# Patient Record
Sex: FEMALE | Race: WHITE | Employment: OTHER | URBAN - METROPOLITAN AREA
[De-identification: names, ages, dates, MRNs, and addresses within clinical notes are randomized per-mention and may not be internally consistent; named-entity substitution may affect disease eponyms.]

---

## 2024-05-18 ENCOUNTER — APPOINTMENT (OUTPATIENT)
Dept: GENERAL RADIOLOGY | Facility: CLINIC | Age: 74
End: 2024-05-18
Payer: MEDICARE

## 2024-05-18 ENCOUNTER — HOSPITAL ENCOUNTER (EMERGENCY)
Facility: CLINIC | Age: 74
Discharge: HOME | End: 2024-05-18
Attending: EMERGENCY MEDICINE
Payer: MEDICARE

## 2024-05-18 VITALS
HEART RATE: 71 BPM | BODY MASS INDEX: 23.95 KG/M2 | DIASTOLIC BLOOD PRESSURE: 74 MMHG | OXYGEN SATURATION: 98 % | SYSTOLIC BLOOD PRESSURE: 128 MMHG | TEMPERATURE: 98.6 F | RESPIRATION RATE: 17 BRPM | HEIGHT: 66 IN | WEIGHT: 149 LBS

## 2024-05-18 DIAGNOSIS — S83.91XA SPRAIN OF RIGHT KNEE, UNSPECIFIED LIGAMENT, INITIAL ENCOUNTER: ICD-10-CM

## 2024-05-18 DIAGNOSIS — S93.401A SPRAIN OF RIGHT ANKLE, UNSPECIFIED LIGAMENT, INITIAL ENCOUNTER: Primary | ICD-10-CM

## 2024-05-18 PROCEDURE — 6370000000 HC RX 637 (ALT 250 FOR IP): Performed by: PHYSICIAN ASSISTANT

## 2024-05-18 PROCEDURE — 99283 EMERGENCY DEPT VISIT LOW MDM: CPT

## 2024-05-18 PROCEDURE — 73562 X-RAY EXAM OF KNEE 3: CPT

## 2024-05-18 PROCEDURE — 73630 X-RAY EXAM OF FOOT: CPT

## 2024-05-18 PROCEDURE — 73610 X-RAY EXAM OF ANKLE: CPT

## 2024-05-18 RX ORDER — ACETAMINOPHEN 500 MG
1000 TABLET ORAL ONCE
Status: COMPLETED | OUTPATIENT
Start: 2024-05-18 | End: 2024-05-18

## 2024-05-18 RX ORDER — SUMATRIPTAN 100 MG/1
100 TABLET, FILM COATED ORAL
COMMUNITY

## 2024-05-18 RX ADMIN — ACETAMINOPHEN 1000 MG: 500 TABLET ORAL at 13:22

## 2024-05-18 ASSESSMENT — PAIN SCALES - GENERAL: PAINLEVEL_OUTOF10: 5

## 2024-05-18 ASSESSMENT — PAIN - FUNCTIONAL ASSESSMENT: PAIN_FUNCTIONAL_ASSESSMENT: 0-10

## 2024-05-18 NOTE — ED PROVIDER NOTES
[facility]  eMERGENCY dEPARTMENT eNCOUnter      Pt Name: Felipa Ferrari  MRN: [id number]  Birthdate 1950  Date of evaluation: 2024  Provider: Elliot Austin PA-C    CHIEF COMPLAINT       Chief Complaint   Patient presents with    Fall    Knee Injury     right    Ankle Pain     right           HISTORY OF PRESENT ILLNESS  (Location/Symptom, Timing/Onset, Context/Setting, Quality, Duration, Modifying Factors, Severity.)   Felipa Ferrari is a 74 y.o. female who presents to the emergency department via EMS after a fall at home.  Patient was coming down the stairs in her socks, slipped falling on her right knee and hurting her right ankle.  She denies any head injury or loss of consciousness.  Denies any hip pain or any back pain or any neck pain.  States that most of the pain is in her right knee and her right ankle.      Nursing Notes were reviewed.    REVIEW OF SYSTEMS    (2-9 systems for level 4, 10 or more for level 5)     Review of Systems     Except as noted above the remainder of the review of systems was reviewed and negative.       PAST MEDICAL HISTORY     Past Medical History:   Diagnosis Date    Migraine      None otherwise stated in nurses notes    SURGICAL HISTORY       Past Surgical History:   Procedure Laterality Date     SECTION      FOOT SURGERY      GALLBLADDER SURGERY      OVARIAN CYST REMOVAL      WISDOM TOOTH EXTRACTION       None otherwise stated in nurses notes    CURRENT MEDICATIONS       Discharge Medication List as of 2024  2:09 PM        CONTINUE these medications which have NOT CHANGED    Details   rimegepant sulfate 75 MG TBDP Take by mouthHistorical Med      SUMAtriptan (IMITREX) 100 MG tablet Take 1 tablet by mouth once as neededHistorical Med             ALLERGIES     Patient has no known allergies.    FAMILY HISTORY     No family history on file.  No family status information on file.      None otherwise stated in nurses notes    SOCIAL  HISTORY         lives at home with others     PHYSICAL EXAM    (up to 7 for level 4, 8 or more for level 5)     ED Triage Vitals [05/18/24 1205]   BP Temp Temp src Pulse Respirations SpO2 Height Weight - Scale   128/74 -- -- 71 17 98 % 1.676 m (5' 6\") 67.6 kg (149 lb)       Physical Exam   Nursing note and vitals reviewed.  Constitutional: Oriented to person, place, and time and well-developed, well-nourished.   Head: Normocephalic and atraumatic.   Ear: External ears normal.   Nose: Nose normal and midline.   Eyes: Conjunctivae and EOM are normal. Pupils are equal, round, and reactive to light.   Neck: Normal range of motion.   Musculoskeletal: Tenderness palpation lateral malleolus of the right ankle.  Range of motion is intact but slightly painful.  There is no obvious deformity or dislocations present.  There is tenderness palpation to the general right knee.  There is some mild swelling noted circumferential.  There is no obvious deformity, unable to assess joint laxity secondary to pain.  Neurological: Alert and oriented to person, place, and time. GCS score is 15.   Skin: Skin is warm and dry. No rash noted. No erythema. No pallor.   Psychiatric: Mood, memory, affect and judgment normal.           DIAGNOSTIC RESULTS     RADIOLOGY:   All plain film, CT, MRI, and formal ultrasound images (except ED bedside ultrasound) are read by the radiologist  XR KNEE RIGHT (3 VIEWS)   Final Result   No acute osseous or soft tissue abnormality.         XR ANKLE RIGHT (MIN 3 VIEWS)   Final Result   No acute osseous or soft tissue abnormality.         XR FOOT RIGHT (MIN 3 VIEWS)   Final Result   No acute osseous or soft tissue abnormality.             LABS:  Labs Reviewed - No data to display    All other labs were within normal range or not returned as of this dictation.    EMERGENCY DEPARTMENT COURSE and DIFFERENTIAL DIAGNOSIS/MDM:   Vitals:    Vitals:    05/18/24 1205 05/18/24 1323   BP: 128/74    Pulse: 71    Resp: 17

## 2024-05-18 NOTE — ED PROVIDER NOTES
Practitioner cases/documentation I have personally evaluated this patient and have completed at least one if not all key elements of the E/M (history, physical exam, and MDM). Additional findings are as noted.    Fall down 1 last step, did not hit head or have loss of consciousness.  Complaining of right knee pain and ankle pain.  Was not able to drive herself secondary to pain.  Denies any paresthesias, or weakness.  X-rays unremarkable.  At this time the patient is without objective evidence of an acute process requiring hospitalization or inpatient management.  The patient has remained hemodynamically stable.  No additional indication for emergency studies at this time.  I answered all questions.  Discussed discharge instructions including standard anticipatory guidance and what should prompt a return to the emergency department, including if they get worse, are not getting better, or develop any new or concerning symptoms.  I have given a specific timeframe in which to follow-up, and who to follow-up with.  The patient demonstrate understanding.  Patient is nontoxic and stable for discharge with outpatient follow-up.        (Please note that portions of this note were completed with a voice recognition program.  Efforts were made to edit the dictations but occasionally words are mis-transcribed.)    Jaki Ricardo DO  Attending Emergency Medicine Physician        Jaki Ricardo,   05/18/24 6799